# Patient Record
(demographics unavailable — no encounter records)

---

## 2017-01-01 NOTE — PN
[place], Progress Note





- [place] Exam


Weight: 6 lb 15 oz


Chest Circumference: 34


Head Circumference: 34


Vital Signs: 


 Vital Signs











Temperature  98.1 F   17 21:00


 


Pulse Rate  138   17 05:20


 


Respiratory Rate  52   17 05:20


 


Blood Pressure  59/36   17 09:00


 


O2 Sat by Pulse Oximetry (%)      











General Appearance: Yes: No Abnormalities, Full ROM, Spontaneous movements, Pink


Skin: Yes: No Abnormalities


Head: Yes: Fontanel flat


Eyes: Yes: Clear


Ears: Yes: Symmetrical


Mouth: No: Cleft lip, Cleft palate


Chest: Yes: Symmetrical


Lungs/Respiratory: Yes: Clear, Bilateral good air entry.  No: Sternal 

retractions, Substernal retractions, Subcostal retractions, Intercostal 

retractions


Cardiac: Yes: S1, S2, Peripheral pulses strong, Capillary refill immediat.  No: 

Murmur


Abdomen: Yes: Umb Ves, 2 artery 1 vein.  No: Mass palpable


Gastrointestinal: No: Hepatomegaly, Splenomegaly


Genitalia: No Abnormalities


Genitalia, Male: Yes: Bilateral testes descended, Penis appears normal


Anus: Yes: Patent


Extremities: Yes: No Abnormalities


Rock Test: Negative


Ortolani Test: Negative


Femoral Pulse: Strong


Spine: No: Sacral dimple, Hair tuft


Reflexes: Daniel: Present, Rooting: Present, Sucking: Present


Neuro: Yes: Alert, Active


Cry: Strong





- Other Data/Findings


Labs, Other Data: 


 Output





Number of Voids                  1


Number of Voids                  1


Number of Voids                  0


Number of Voids                  0


Number of Voids                  1


Number of Voids                  1


Stool Size                       Small


Stool Size                       Moderate


 Stool Description        Meconium,Pasty


[place] Stool Description        Meconium,Pasty





 Baby's Blood Type, Sarai











Cord Blood Type  B POSITIVE   17  03:05    


 


ARIC, Poly Interpret  Negative  (NEGATIVE)   17  03:05    














Problem List





- Problems


(1) Single liveborn infant delivered vaginally


Assessment/Plan: 


AGA MALE BORN TO 29YO   GBS POSITIVE MOTHER WITH PROM 23HRS TREATED X 2 


P: ROUTINE CARE


FEED AD DAWNA


START DISCHARGE PLANNING


Code(s): Z38.00 - SINGLE LIVEBORN INFANT, DELIVERED VAGINALLY
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
## 2017-01-01 NOTE — PN
Progress Note (short form)





- Note


Progress Note: 


10.30 AM circumcision was done . surgicel used . hemostasis noted .


infant stable